# Patient Record
Sex: FEMALE | Race: WHITE | NOT HISPANIC OR LATINO | Employment: FULL TIME | URBAN - METROPOLITAN AREA
[De-identification: names, ages, dates, MRNs, and addresses within clinical notes are randomized per-mention and may not be internally consistent; named-entity substitution may affect disease eponyms.]

---

## 2024-03-11 ENCOUNTER — TELEPHONE (OUTPATIENT)
Dept: PRIMARY CARE | Facility: CLINIC | Age: 29
End: 2024-03-11
Payer: COMMERCIAL

## 2024-03-11 NOTE — TELEPHONE ENCOUNTER
Dr. Fraga saw patient in hospital. She was admitted on 3/6/24, discharged on 3/9/24. Requesting a letter from Dr. Fraga excusing her from work these days.

## 2024-03-13 ENCOUNTER — OFFICE VISIT (OUTPATIENT)
Dept: PRIMARY CARE | Facility: CLINIC | Age: 29
End: 2024-03-13
Payer: COMMERCIAL

## 2024-03-13 VITALS
BODY MASS INDEX: 34.78 KG/M2 | WEIGHT: 189 LBS | SYSTOLIC BLOOD PRESSURE: 109 MMHG | DIASTOLIC BLOOD PRESSURE: 70 MMHG | HEART RATE: 83 BPM | HEIGHT: 62 IN | OXYGEN SATURATION: 96 %

## 2024-03-13 DIAGNOSIS — K52.9 ACUTE GASTROENTERITIS: ICD-10-CM

## 2024-03-13 DIAGNOSIS — Z76.89 ENCOUNTER FOR SUPPORT AND COORDINATION OF TRANSITION OF CARE: Primary | ICD-10-CM

## 2024-03-13 PROBLEM — T78.3XXA ANGIOEDEMA: Status: ACTIVE | Noted: 2019-04-08

## 2024-03-13 PROCEDURE — 1036F TOBACCO NON-USER: CPT | Performed by: EMERGENCY MEDICINE

## 2024-03-13 PROCEDURE — 99496 TRANSJ CARE MGMT HIGH F2F 7D: CPT | Performed by: EMERGENCY MEDICINE

## 2024-03-13 RX ORDER — HYDROXYCHLOROQUINE SULFATE 200 MG/1
1 TABLET, FILM COATED ORAL 2 TIMES DAILY
COMMUNITY
Start: 2021-04-26

## 2024-03-13 RX ORDER — AZITHROMYCIN 250 MG/1
TABLET, FILM COATED ORAL
COMMUNITY
Start: 2024-03-11

## 2024-03-13 RX ORDER — METRONIDAZOLE 250 MG/1
250 TABLET ORAL 3 TIMES DAILY
COMMUNITY

## 2024-03-13 RX ORDER — FOLIC ACID 1 MG/1
1 TABLET ORAL DAILY
COMMUNITY
Start: 2024-03-09

## 2024-03-13 RX ORDER — ONDANSETRON 4 MG/1
TABLET, ORALLY DISINTEGRATING ORAL
COMMUNITY
Start: 2020-10-22

## 2024-03-13 RX ORDER — MERCAPTOPURINE 50 MG/1
100 TABLET ORAL DAILY
COMMUNITY

## 2024-03-13 NOTE — PROGRESS NOTES
Subjective   Patient ID: Lala Deluca is a 28 y.o. female who presents for Hospital Follow-up.    Assessment/Plan   Problem List Items Addressed This Visit    None  Visit Diagnoses       Encounter for support and coordination of transition of care    -  Primary    Acute gastroenteritis              Gastroenteritis- completing flagyl and azithromycin     Chron's disease- stable on mercaptopurine, follows with GI [facility].     Angioedema- extensive work up with negative findings, stable on plaquenil    Follow up in 6 months or sooner as needed     Source of history: Nurse, Medical personnel, Medical record, Patient.  History limitation: None.    HPI  28 y.o. female here for transition of care visit     Patient was discharged from [facility] on 3/9/24 and there was interactive contact by patient/family or facility staff on behalf of patient with me/office within 2 working days regarding patient's transition    Patient was recently admitted to the hospital.  Patient's history regarding the reason for hospital admission and the course in the hospital was reviewed with patinet and/or family.  Patient's medical records including lab work, radiology and other medical notes were reviewed.  His medication list prior to admission and discharge medication list are compared and updated.    Patient encounter was done face-to-face    Patient is unable to give detailed history and therefore history is obtained from the chart    History from hospitalization-  Admitted with fever and gastroenteritis, history of Chron's disease. Stool culture positive positive campylobacter and e coli.   GI consulted.   Antibiotic treatment initiated.   Stabilized and discharged home.     Continues to feels generalized malaise and decreased appetite, suspect secondary to antibiotics.    On flagyl and azithromycin.     Stools now formed, but still loose.   No vomiting. No fever.     History of angioedema, work up negative. In  "remission and stable on plaquenil. Off all antihistamines.     No Known Allergies    Current Outpatient Medications   Medication Sig Dispense Refill    azithromycin (Zithromax) 250 mg tablet       folic acid (Folvite) 1 mg tablet Take 1 tablet (1 mg) by mouth once daily.      hydroxychloroquine (Plaquenil) 200 mg tablet Take 1 tablet (200 mg) by mouth 2 times a day.      loperamide HCl (IMODIUM ORAL) Take by mouth.      mercaptopurine (Purinethol) 50 mg tablet Take 2 tablets (100 mg total) by mouth once daily.      metroNIDAZOLE (Flagyl) 250 mg tablet Take 1 tablet (250 mg) by mouth 3 times a day.      ondansetron ODT (Zofran-ODT) 4 mg disintegrating tablet Take by mouth.       No current facility-administered medications for this visit.       Objective   Visit Vitals  /70   Pulse 83   Ht 1.575 m (5' 2\")   Wt 85.7 kg (189 lb)   SpO2 96%   BMI 34.57 kg/m²   Smoking Status Never   BSA 1.94 m²     Physical Exam  Vital signs as per nursing/MA documentation   General appearance: Alert and in no acute distress  HEENT: Normal Inspection   Neck: Normal Inspection   Respiratory: No respiratory distress Lungs are clear   Cardiovascular: Heart rate normal. No gallop  Back: Normal Inspection   Skin inspection: Warm   Musculoskeletal: No deformities   Neuro: Limited exam. Baseline    Review of Systems  Comprehensive review of systems as allowed by patient condition and nursing input is negative    No visits with results within 4 Month(s) from this visit.   Latest known visit with results is:   Legacy Encounter on 12/10/2021   Component Date Value Ref Range Status    Pathology Report 12/10/2021    Final                    Value:Name DAREN ANANDMaddy                                                                                                   Accession #: [id number]            Pathologist:                   MOHIT MARTINEZ MD  Date of Procedure:    12/10/2021  Date Received:          12/10/2021  Date Reported     " "      12/13/2021  Submitting Physician:   DELLA PEÑA MD  Location:                    [facility]  Other External #                                                                    FINAL DIAGNOSIS  A.  ILEOCOLONIC ANASTOMOSIS, COLD BIOPSY:    -- SMALL INTESTINAL  MUCOSA WITH PATCHY MILD ACUTE INFLAMMATION, OCCASIONAL  CRYPTITIS AND CRYPT ABSCESSES (SEE COMMENT).  -- COLONIC MUCOSA, NO SIGNIFICANT PATHOLOGIC FINDINGS.      B.  RECTUM, COLD BIOPSY:    -- COLONIC MUCOSA WITH SCATTERED REACTIVE LYMPHOID AGGREGATES (SEE COMMENT).    Comment:  In specimens \"A and B\", granulomas are not identified.  There is no evidence of  dysplasia.                                                                                                                                                                                                                                                                                                                                                                                                                                                                                                                   Electronically Signed Out By MOHIT MARTINEZ MD/[facility]  By the signature on this report, the individual or group listed as making the  Final Interpretation/Diagnosis certifies that they have reviewed this case.           Microscopic Description:  All slides are examined microscopically and the diagnosis is stated above.    Clinical History:  history of Crohn's disease  evaluate for inflammation                   Specimens Submitted As:  A: ILEOCOLONIC ANASTOMOSIS, COLD BIOPSY   B: RECTUM, COLD BIOPSY     Gross Description:  A:  Received in formalin, labeled with the patient's name                           and hospital number  and \"A.  Ileocolonic anastomosis\", are multiple fragments of tan, soft tissue  aggregating to 0.8 x 0.3 x 0.2 cm.  The specimen is submitted in toto in one  cassette.  KLS    B:  " "Received in formalin, labeled with the patient's name and hospital number  and \"B.  Rectum\", are 3 fragments of tan, soft tissue aggregating to 0.8 x 0.3  x 0.2 cm.  The specimen is submitted in toto in one cassette.  TAVO cortes/12/10/2021               [facility]  Department of Pathology   [address] [place] [address]        CONVERTED FINAL DIAGNOSIS 12/10/2021    Final                    Value:A.  ILEOCOLONIC ANASTOMOSIS, COLD BIOPSY:    -- SMALL INTESTINAL  MUCOSA WITH PATCHY MILD ACUTE INFLAMMATION, OCCASIONAL  CRYPTITIS AND CRYPT ABSCESSES (SEE COMMENT).  -- COLONIC MUCOSA, NO SIGNIFICANT PATHOLOGIC FINDINGS.      B.  RECTUM, COLD BIOPSY:    -- COLONIC MUCOSA WITH SCATTERED REACTIVE LYMPHOID AGGREGATES (SEE COMMENT).    Comment:  In specimens \"A and B\", granulomas are not identified.  There is no evidence of  dysplasia.          CONVERTED CLINICAL DIAGNOSIS-HISTO* 12/10/2021    Final                    Value:history of Crohn's disease  evaluate for inflammation                     CONVERTED GROSS DESCRIPTION 12/10/2021    Final                    Value:A:  Received in formalin, labeled with the patient's name and hospital number  and \"A.  Ileocolonic anastomosis\", are multiple fragments of tan, soft tissue  aggregating to 0.8 x 0.3 x 0.2 cm.  The specimen is submitted in toto in one  cassette.  KLS    B:  Received in formalin, labeled with the patient's name and hospital number  and \"B.  Rectum\", are 3 fragments of tan, soft tissue aggregating to 0.8 x 0.3  x 0.2 cm.  The specimen is submitted in toto in one cassette.  \[facility] [place] [facility]\""        CONVERTED MICROSCOPIC DESCRIPTION 12/10/2021 All slides are examined microscopically and the diagnosis is stated above.   Final    CONVERTED FINAL REPORT PDF LINK TO* 12/10/2021 \\copathshare\copath\PDF [id number]   Final       Radiology: Reviewed imaging in powerchart.  No results found.    No family history on file.  Social " History     Socioeconomic History    Marital status: Single     Spouse name: None    Number of children: None    Years of education: None    Highest education level: None   Occupational History    None   Tobacco Use    Smoking status: Never    Smokeless tobacco: Never   Substance and Sexual Activity    Alcohol use: Not Currently    Drug use: None    Sexual activity: None   Other Topics Concern    None   Social History Narrative    None     Social Determinants of Health     Financial Resource Strain: Not on file   Food Insecurity: Not on file   Transportation Needs: Not on file   Physical Activity: Not on file   Stress: Not on file   Social Connections: Not on file   Intimate Partner Violence: Not on file   Housing Stability: Not on file     Past Medical History:   Diagnosis Date    Abnormal findings on diagnostic imaging of other parts of digestive tract 03/05/2014    Abnormal colonoscopy    Other specified disorders of gingiva and edentulous alveolar ridge 01/07/2016    Gingival bleeding     Past Surgical History:   Procedure Laterality Date    EXPLORATORY LAPAROTOMY  04/10/2014    Exploratory Laparotomy    ILEOSTOMY  04/10/2014    Ileostomy    ILEOSTOMY CLOSURE  12/18/2014    Ileostomy Closure    MOUTH SURGERY  03/05/2014    Oral Surgery Tooth Extraction       Scribe Attestation  By signing my name below, IAnnie , Scribe   attest that this documentation has been prepared under the direction and in the presence of Reece Fraga MD.